# Patient Record
Sex: MALE | Race: WHITE | NOT HISPANIC OR LATINO | URBAN - METROPOLITAN AREA
[De-identification: names, ages, dates, MRNs, and addresses within clinical notes are randomized per-mention and may not be internally consistent; named-entity substitution may affect disease eponyms.]

---

## 2017-10-06 ENCOUNTER — EMERGENCY (EMERGENCY)
Facility: HOSPITAL | Age: 37
LOS: 1 days | Discharge: HOME | End: 2017-10-06
Attending: EMERGENCY MEDICINE | Admitting: EMERGENCY MEDICINE
Payer: SELF-PAY

## 2017-10-06 VITALS
TEMPERATURE: 98 F | SYSTOLIC BLOOD PRESSURE: 120 MMHG | RESPIRATION RATE: 20 BRPM | OXYGEN SATURATION: 97 % | HEART RATE: 101 BPM | DIASTOLIC BLOOD PRESSURE: 70 MMHG

## 2017-10-06 VITALS
TEMPERATURE: 98 F | SYSTOLIC BLOOD PRESSURE: 121 MMHG | RESPIRATION RATE: 20 BRPM | HEART RATE: 114 BPM | DIASTOLIC BLOOD PRESSURE: 76 MMHG | OXYGEN SATURATION: 96 %

## 2017-10-06 DIAGNOSIS — G40.909 EPILEPSY, UNSPECIFIED, NOT INTRACTABLE, WITHOUT STATUS EPILEPTICUS: ICD-10-CM

## 2017-10-06 LAB
ALBUMIN SERPL ELPH-MCNC: 4.2 G/DL — SIGNIFICANT CHANGE UP (ref 3.4–5)
ALP SERPL-CCNC: 84 U/L — SIGNIFICANT CHANGE UP (ref 40–120)
ALT FLD-CCNC: 53 U/L — HIGH (ref 12–42)
ANION GAP SERPL CALC-SCNC: 8 MMOL/L — LOW (ref 9–16)
APTT BLD: 27.9 SEC — SIGNIFICANT CHANGE UP (ref 27.5–36.5)
AST SERPL-CCNC: 37 U/L — SIGNIFICANT CHANGE UP (ref 15–37)
BASOPHILS NFR BLD AUTO: 0.3 % — SIGNIFICANT CHANGE UP (ref 0–2)
BILIRUB SERPL-MCNC: 0.3 MG/DL — SIGNIFICANT CHANGE UP (ref 0.2–1.2)
BUN SERPL-MCNC: 26 MG/DL — HIGH (ref 7–23)
CALCIUM SERPL-MCNC: 9 MG/DL — SIGNIFICANT CHANGE UP (ref 8.5–10.5)
CHLORIDE SERPL-SCNC: 102 MMOL/L — SIGNIFICANT CHANGE UP (ref 96–108)
CO2 SERPL-SCNC: 28 MMOL/L — SIGNIFICANT CHANGE UP (ref 22–31)
CREAT SERPL-MCNC: 1.31 MG/DL — HIGH (ref 0.5–1.3)
EOSINOPHIL NFR BLD AUTO: 0.3 % — SIGNIFICANT CHANGE UP (ref 0–6)
ETHANOL SERPL-MCNC: <3 MG/DL — SIGNIFICANT CHANGE UP
GLUCOSE SERPL-MCNC: 132 MG/DL — HIGH (ref 70–99)
HCT VFR BLD CALC: 42.3 % — SIGNIFICANT CHANGE UP (ref 39–50)
HGB BLD-MCNC: 15 G/DL — SIGNIFICANT CHANGE UP (ref 13–17)
IMM GRANULOCYTES NFR BLD AUTO: 0.9 % — SIGNIFICANT CHANGE UP (ref 0–1.5)
INR BLD: 1.09 — SIGNIFICANT CHANGE UP (ref 0.88–1.16)
LYMPHOCYTES # BLD AUTO: 12.4 % — LOW (ref 13–44)
MCHC RBC-ENTMCNC: 32.6 PG — SIGNIFICANT CHANGE UP (ref 27–34)
MCHC RBC-ENTMCNC: 35.5 G/DL — SIGNIFICANT CHANGE UP (ref 32–36)
MCV RBC AUTO: 92 FL — SIGNIFICANT CHANGE UP (ref 80–100)
MONOCYTES NFR BLD AUTO: 6.6 % — SIGNIFICANT CHANGE UP (ref 2–14)
NEUTROPHILS NFR BLD AUTO: 79.5 % — HIGH (ref 43–77)
PLATELET # BLD AUTO: 248 K/UL — SIGNIFICANT CHANGE UP (ref 150–400)
POTASSIUM SERPL-MCNC: 3.7 MMOL/L — SIGNIFICANT CHANGE UP (ref 3.5–5.3)
POTASSIUM SERPL-SCNC: 3.7 MMOL/L — SIGNIFICANT CHANGE UP (ref 3.5–5.3)
PROT SERPL-MCNC: 7.9 G/DL — SIGNIFICANT CHANGE UP (ref 6.4–8.2)
PROTHROM AB SERPL-ACNC: 12 SEC — SIGNIFICANT CHANGE UP (ref 9.8–12.7)
RBC # BLD: 4.6 M/UL — SIGNIFICANT CHANGE UP (ref 4.2–5.8)
RBC # FLD: 12.1 % — SIGNIFICANT CHANGE UP (ref 10.3–16.9)
SODIUM SERPL-SCNC: 138 MMOL/L — SIGNIFICANT CHANGE UP (ref 132–145)
WBC # BLD: 9.4 K/UL — SIGNIFICANT CHANGE UP (ref 3.8–10.5)
WBC # FLD AUTO: 9.4 K/UL — SIGNIFICANT CHANGE UP (ref 3.8–10.5)

## 2017-10-06 PROCEDURE — 99285 EMERGENCY DEPT VISIT HI MDM: CPT | Mod: 25

## 2017-10-06 PROCEDURE — 93010 ELECTROCARDIOGRAM REPORT: CPT

## 2017-10-06 RX ORDER — SODIUM CHLORIDE 9 MG/ML
1000 INJECTION INTRAMUSCULAR; INTRAVENOUS; SUBCUTANEOUS ONCE
Qty: 0 | Refills: 0 | Status: COMPLETED | OUTPATIENT
Start: 2017-10-06 | End: 2017-10-06

## 2017-10-06 RX ADMIN — SODIUM CHLORIDE 1000 MILLILITER(S): 9 INJECTION INTRAMUSCULAR; INTRAVENOUS; SUBCUTANEOUS at 21:00

## 2017-10-06 NOTE — ED PROVIDER NOTE - OBJECTIVE STATEMENT
Pt is a 38yo M with a h/o a szr d/o who is BIB EMS after having a szr this evening.  Witnessed GTC activity.  pt has no recollection of event.  No incontinence but does have alteral tongue lac.  Last szr was 2-3 months ago and admits he doesn't have a neurologist here in [facility] to follow-up with.  Pt takes Lamotrigine 200mg q hs (has not taken today's dose).  Has medication here and takes his own.  Denies any recent illness, trauma, or other complaints.

## 2017-10-06 NOTE — ED ADULT TRIAGE NOTE - CHIEF COMPLAINT QUOTE
BIBA for witnessed seizure while sitting on a couch at [facility] where he is staying. +tongue bite. Patient is known seizure patient, takes lamotrigine and is complete with medication. No incontinence during seizure. Patient is currently aaox3, 18G heplock left forearm. placed by medics in field. No distress. BIBA for witnessed seizure while sitting on a couch at [facility] where he is staying. +tongue bite. Patient is known seizure patient, takes lamotrigine and is compliant with medication. No incontinence during seizure. Patient is currently aaox3, 18G heplock left forearm. placed by medics in field. No distress.

## 2017-10-06 NOTE — ED ADULT NURSE NOTE - OBJECTIVE STATEMENT
here s/p seizure tonight in hotel; witnessed; hx sseizures; last one less than 3 months ago; on meds and compliant with them; no incontinence noted or obvious injury; alert and orientedx3; unsure of length of seizure

## 2017-10-06 NOTE — ED PROVIDER NOTE - MEDICAL DECISION MAKING DETAILS
Szr - will check labs and give IV hydration.  Pt is stable for d/c and gave referral to a new neurologist here in [facility].

## 2017-10-06 NOTE — ED ADULT NURSE NOTE - CHIEF COMPLAINT QUOTE
BIBA for witnessed seizure while sitting on a couch at [facility] where he is staying. +tongue bite. Patient is known seizure patient, takes lamotrigine and is complete with medication. No incontinence during seizure. Patient is currently aaox3, 18G heplock left forearm. placed by medics in field. No distress.

## 2017-10-06 NOTE — ED PROVIDER NOTE - PHYSICAL EXAMINATION
VITAL SIGNS: I have reviewed nursing notes and confirm.  CONSTITUTIONAL: Well-developed; well-nourished; in no acute distress.  SKIN: Skin is warm and dry, no acute rash.  HEAD: Normocephalic; atraumatic.  EYES: PERRL, EOM intact; conjunctiva and sclera clear.  ENT: No nasal discharge; airway clear.  NECK: Supple; non tender.  CARD: S1, S2 normal; no murmurs, gallops, or rubs. Regular rate and rhythm.  RESP: No wheezes, rales or rhonchi.  ABD: Normal bowel sounds; soft; non-distended; non-tender; no hepatosplenomegaly.  EXT: Normal ROM. No clubbing, cyanosis or edema.  NEURO: Alert, oriented. Grossly unremarkable, CN 2-12 intact, motor 5/5 throughout, SILT throughout, normal gait.   PSYCH: Cooperative, appropriate.

## 2018-08-05 VITALS
DIASTOLIC BLOOD PRESSURE: 77 MMHG | OXYGEN SATURATION: 97 % | HEART RATE: 108 BPM | SYSTOLIC BLOOD PRESSURE: 113 MMHG | RESPIRATION RATE: 20 BRPM | TEMPERATURE: 98 F

## 2018-08-05 LAB
ALBUMIN SERPL ELPH-MCNC: 4.4 G/DL — SIGNIFICANT CHANGE UP (ref 3.4–5)
ALP SERPL-CCNC: 81 U/L — SIGNIFICANT CHANGE UP (ref 40–120)
ALT FLD-CCNC: 52 U/L — HIGH (ref 12–42)
ANION GAP SERPL CALC-SCNC: 28 MMOL/L — HIGH (ref 9–16)
APAP SERPL-MCNC: <2 UG/ML — LOW (ref 10–30)
APTT BLD: 29.8 SEC — SIGNIFICANT CHANGE UP (ref 27.5–36.5)
AST SERPL-CCNC: 39 U/L — HIGH (ref 15–37)
BASOPHILS NFR BLD AUTO: 0.3 % — SIGNIFICANT CHANGE UP (ref 0–2)
BILIRUB SERPL-MCNC: 0.2 MG/DL — SIGNIFICANT CHANGE UP (ref 0.2–1.2)
BUN SERPL-MCNC: 21 MG/DL — SIGNIFICANT CHANGE UP (ref 7–23)
CALCIUM SERPL-MCNC: 10.6 MG/DL — HIGH (ref 8.5–10.5)
CHLORIDE SERPL-SCNC: 108 MMOL/L — SIGNIFICANT CHANGE UP (ref 96–108)
CK SERPL-CCNC: 360 U/L — HIGH (ref 39–308)
CO2 SERPL-SCNC: 16 MMOL/L — LOW (ref 22–31)
CREAT SERPL-MCNC: 1.33 MG/DL — HIGH (ref 0.5–1.3)
EOSINOPHIL NFR BLD AUTO: 1 % — SIGNIFICANT CHANGE UP (ref 0–6)
ETHANOL SERPL-MCNC: <3 MG/DL — SIGNIFICANT CHANGE UP
GLUCOSE SERPL-MCNC: 124 MG/DL — HIGH (ref 70–99)
HCT VFR BLD CALC: 47.9 % — SIGNIFICANT CHANGE UP (ref 39–50)
HGB BLD-MCNC: 15.7 G/DL — SIGNIFICANT CHANGE UP (ref 13–17)
IMM GRANULOCYTES NFR BLD AUTO: 0.7 % — SIGNIFICANT CHANGE UP (ref 0–1.5)
INR BLD: 0.99 — SIGNIFICANT CHANGE UP (ref 0.88–1.16)
LYMPHOCYTES # BLD AUTO: 37.8 % — SIGNIFICANT CHANGE UP (ref 13–44)
MAGNESIUM SERPL-MCNC: 2.4 MG/DL — SIGNIFICANT CHANGE UP (ref 1.6–2.6)
MCHC RBC-ENTMCNC: 32.7 PG — SIGNIFICANT CHANGE UP (ref 27–34)
MCHC RBC-ENTMCNC: 32.8 G/DL — SIGNIFICANT CHANGE UP (ref 32–36)
MCV RBC AUTO: 99.8 FL — SIGNIFICANT CHANGE UP (ref 80–100)
MONOCYTES NFR BLD AUTO: 8.1 % — SIGNIFICANT CHANGE UP (ref 2–14)
NEUTROPHILS NFR BLD AUTO: 52.1 % — SIGNIFICANT CHANGE UP (ref 43–77)
PCO2 BLDV: 48 MMHG — SIGNIFICANT CHANGE UP (ref 41–51)
PH BLDV: 7.18 — CRITICAL LOW (ref 7.32–7.43)
PHOSPHATE SERPL-MCNC: 3.9 MG/DL — SIGNIFICANT CHANGE UP (ref 2.5–4.5)
PLATELET # BLD AUTO: 298 K/UL — SIGNIFICANT CHANGE UP (ref 150–400)
PO2 BLDV: 79 MMHG — HIGH (ref 35–40)
POTASSIUM SERPL-MCNC: 3.7 MMOL/L — SIGNIFICANT CHANGE UP (ref 3.5–5.3)
POTASSIUM SERPL-SCNC: 3.7 MMOL/L — SIGNIFICANT CHANGE UP (ref 3.5–5.3)
PROT SERPL-MCNC: 8.6 G/DL — HIGH (ref 6.4–8.2)
PROTHROM AB SERPL-ACNC: 10.9 SEC — SIGNIFICANT CHANGE UP (ref 9.8–12.7)
RBC # BLD: 4.8 M/UL — SIGNIFICANT CHANGE UP (ref 4.2–5.8)
RBC # FLD: 12.1 % — SIGNIFICANT CHANGE UP (ref 10.3–16.9)
SALICYLATES SERPL-MCNC: 2.6 MG/DL — LOW (ref 2.8–20)
SAO2 % BLDV: 93 % — SIGNIFICANT CHANGE UP
SODIUM SERPL-SCNC: 152 MMOL/L — HIGH (ref 132–145)
WBC # BLD: 12 K/UL — HIGH (ref 3.8–10.5)
WBC # FLD AUTO: 12 K/UL — HIGH (ref 3.8–10.5)

## 2018-08-05 PROCEDURE — 99291 CRITICAL CARE FIRST HOUR: CPT

## 2018-08-05 PROCEDURE — 93010 ELECTROCARDIOGRAM REPORT: CPT

## 2018-08-05 RX ORDER — LEVETIRACETAM 250 MG/1
1000 TABLET, FILM COATED ORAL ONCE
Qty: 0 | Refills: 0 | Status: COMPLETED | OUTPATIENT
Start: 2018-08-05 | End: 2018-08-05

## 2018-08-05 RX ORDER — SODIUM CHLORIDE 9 MG/ML
2000 INJECTION INTRAMUSCULAR; INTRAVENOUS; SUBCUTANEOUS ONCE
Qty: 0 | Refills: 0 | Status: COMPLETED | OUTPATIENT
Start: 2018-08-05 | End: 2018-08-05

## 2018-08-05 RX ADMIN — SODIUM CHLORIDE 2000 MILLILITER(S): 9 INJECTION INTRAMUSCULAR; INTRAVENOUS; SUBCUTANEOUS at 23:07

## 2018-08-05 RX ADMIN — Medication 2 MILLIGRAM(S): at 23:00

## 2018-08-05 RX ADMIN — LEVETIRACETAM 440 MILLIGRAM(S): 250 TABLET, FILM COATED ORAL at 23:06

## 2018-08-05 NOTE — ED PROVIDER NOTE - PROGRESS NOTE DETAILS
sister [phone]  hx of sz, noncompliance, EtOH use. postictal, 4mg of ativan, will need stepdown/ICU bed, called transfer center, admitted under neuro to [facility] sister [phone], listed as emergency contact, called to obtain collateral information as pt unable to give history.  also discussed most likely will need to transfer to [facility] for further evaluation.   sister call his parents.    hx of sz, noncompliance, EtOH use.

## 2018-08-05 NOTE — ED PROVIDER NOTE - OBJECTIVE STATEMENT
38 yom bibems for sz at home, accompanied by friend, who did not witness the entire episode but was awaken by the noise.  pt w/ hx of seizure, on lamictal, reported noncompliance per EMS.  pt had another episode of sz in ED, w/ GTC, rec'd ativan, postictal.  called family, hx of noncompliance, w/ hx of EtOH use, unclear whether about hx of withdrawal.  friend states did not use any substance in the last 24 hours but unsure prior.

## 2018-08-05 NOTE — ED PROVIDER NOTE - CRITICAL CARE PROVIDED
telephone consultation with the patient's family/additional history taking/consult w/ pt's family directly relating to pts condition/direct patient care (not related to procedure)

## 2018-08-05 NOTE — ED ADULT NURSE NOTE - NSIMPLEMENTINTERV_GEN_ALL_ED
Implemented All Fall Risk Interventions:  [place] to call system. Call bell, personal items and telephone within reach. Instruct patient to call for assistance. Room bathroom lighting operational. Non-slip footwear when patient is off stretcher. Physically safe environment: no spills, clutter or unnecessary equipment. Stretcher in lowest position, wheels locked, appropriate side rails in place. Provide visual cue, wrist band, yellow gown, etc. Monitor gait and stability. Monitor for mental status changes and reorient to person, place, and time. Review medications for side effects contributing to fall risk. Reinforce activity limits and safety measures with patient and family.

## 2018-08-05 NOTE — ED ADULT NURSE NOTE - OBJECTIVE STATEMENT
pt is a 38 year old male who comes into the ed c/o seizure. pt accompanied by friend who reports she woke up to patient seizing in bed with no injury or falls. pt was awake in ambulance and reported hx of seizures, pt had a witnessed seizure upon arrival to ED w/ tongue biting. pt postictal in bed. suction setup at bedside, seizure precautions maintained, pt placed on cardiac monitor and IV placed.

## 2018-08-05 NOTE — ED ADULT TRIAGE NOTE - CHIEF COMPLAINT QUOTE
Patient arrived via EMS complaining of seizures with history of seizure; patient has been noncompliant with his lamictal; had 1 seizure prior to arrival in a bed with no injury or trauma; patient then seized upon arrival to ED with tongue bitting, tonic clonic movements

## 2018-08-05 NOTE — ED PROVIDER NOTE - PHYSICAL EXAMINATION
CON: actively sz, now postictal after ativan, HENMT: soft neck, HEAD: atraumatic, CV: tachycardic, RESP: cta b/l, GI: +BS, soft, nontender, no rebound, no guarding, SKIN: no rash, MSK: no deformities, NEURO: noted GTC, now somnolent after ativan,

## 2018-08-05 NOTE — ED PROVIDER NOTE - MEDICAL DECISION MAKING DETAILS
seizure, rec'd ativan, protecting airway, eval for metabolic derangement, ?noncompliance, ?withdrawal sz, ?substance, CT head    noted hyperglycemia, AGAP, blood gas added, likely lactic acidosis given recent sz, free water deficit ~4L, will continue to hydrate, ICU consultation

## 2018-08-06 ENCOUNTER — INPATIENT (INPATIENT)
Facility: HOSPITAL | Age: 38
LOS: 0 days | Discharge: HOME | DRG: 101 | End: 2018-08-06
Attending: PSYCHIATRY & NEUROLOGY | Admitting: PSYCHIATRY & NEUROLOGY
Payer: MEDICAID

## 2018-08-06 ENCOUNTER — TRANSCRIPTION ENCOUNTER (OUTPATIENT)
Age: 38
End: 2018-08-06

## 2018-08-06 DIAGNOSIS — D72.829 ELEVATED WHITE BLOOD CELL COUNT, UNSPECIFIED: ICD-10-CM

## 2018-08-06 DIAGNOSIS — E87.0 HYPEROSMOLALITY AND HYPERNATREMIA: ICD-10-CM

## 2018-08-06 DIAGNOSIS — F14.10 COCAINE ABUSE, UNCOMPLICATED: ICD-10-CM

## 2018-08-06 DIAGNOSIS — Z91.89 OTHER SPECIFIED PERSONAL RISK FACTORS, NOT ELSEWHERE CLASSIFIED: ICD-10-CM

## 2018-08-06 DIAGNOSIS — E87.2 ACIDOSIS: ICD-10-CM

## 2018-08-06 DIAGNOSIS — G40.901 EPILEPSY, UNSPECIFIED, NOT INTRACTABLE, WITH STATUS EPILEPTICUS: ICD-10-CM

## 2018-08-06 DIAGNOSIS — N17.9 ACUTE KIDNEY FAILURE, UNSPECIFIED: ICD-10-CM

## 2018-08-06 DIAGNOSIS — Z29.9 ENCOUNTER FOR PROPHYLACTIC MEASURES, UNSPECIFIED: ICD-10-CM

## 2018-08-06 DIAGNOSIS — R63.8 OTHER SYMPTOMS AND SIGNS CONCERNING FOOD AND FLUID INTAKE: ICD-10-CM

## 2018-08-06 PROBLEM — R56.9 UNSPECIFIED CONVULSIONS: Chronic | Status: ACTIVE | Noted: 2017-10-06

## 2018-08-06 LAB
ALBUMIN SERPL ELPH-MCNC: 3.7 G/DL — SIGNIFICANT CHANGE UP (ref 3.3–5)
ALBUMIN SERPL ELPH-MCNC: 4.2 G/DL — SIGNIFICANT CHANGE UP (ref 3.3–5)
ALP SERPL-CCNC: 55 U/L — SIGNIFICANT CHANGE UP (ref 40–120)
ALP SERPL-CCNC: 59 U/L — SIGNIFICANT CHANGE UP (ref 40–120)
ALT FLD-CCNC: 31 U/L — SIGNIFICANT CHANGE UP (ref 10–45)
ALT FLD-CCNC: 35 U/L — SIGNIFICANT CHANGE UP (ref 10–45)
ANION GAP SERPL CALC-SCNC: 11 MMOL/L — SIGNIFICANT CHANGE UP (ref 5–17)
ANION GAP SERPL CALC-SCNC: 11 MMOL/L — SIGNIFICANT CHANGE UP (ref 5–17)
AST SERPL-CCNC: 27 U/L — SIGNIFICANT CHANGE UP (ref 10–40)
AST SERPL-CCNC: 34 U/L — SIGNIFICANT CHANGE UP (ref 10–40)
BILIRUB SERPL-MCNC: 0.2 MG/DL — SIGNIFICANT CHANGE UP (ref 0.2–1.2)
BILIRUB SERPL-MCNC: 0.2 MG/DL — SIGNIFICANT CHANGE UP (ref 0.2–1.2)
BUN SERPL-MCNC: 15 MG/DL — SIGNIFICANT CHANGE UP (ref 7–23)
BUN SERPL-MCNC: 18 MG/DL — SIGNIFICANT CHANGE UP (ref 7–23)
CALCIUM SERPL-MCNC: 8.4 MG/DL — SIGNIFICANT CHANGE UP (ref 8.4–10.5)
CALCIUM SERPL-MCNC: 8.6 MG/DL — SIGNIFICANT CHANGE UP (ref 8.4–10.5)
CHLORIDE SERPL-SCNC: 107 MMOL/L — SIGNIFICANT CHANGE UP (ref 96–108)
CHLORIDE SERPL-SCNC: 107 MMOL/L — SIGNIFICANT CHANGE UP (ref 96–108)
CK SERPL-CCNC: 348 U/L — HIGH (ref 30–200)
CK SERPL-CCNC: 357 U/L — HIGH (ref 30–200)
CO2 SERPL-SCNC: 25 MMOL/L — SIGNIFICANT CHANGE UP (ref 22–31)
CO2 SERPL-SCNC: 26 MMOL/L — SIGNIFICANT CHANGE UP (ref 22–31)
CREAT ?TM UR-MCNC: 44 MG/DL — SIGNIFICANT CHANGE UP
CREAT SERPL-MCNC: 1.04 MG/DL — SIGNIFICANT CHANGE UP (ref 0.5–1.3)
CREAT SERPL-MCNC: 1.11 MG/DL — SIGNIFICANT CHANGE UP (ref 0.5–1.3)
GAS PNL BLDV: SIGNIFICANT CHANGE UP
GLUCOSE SERPL-MCNC: 101 MG/DL — HIGH (ref 70–99)
GLUCOSE SERPL-MCNC: 124 MG/DL — HIGH (ref 70–99)
HCT VFR BLD CALC: 38.1 % — LOW (ref 39–50)
HCT VFR BLD CALC: 38.9 % — LOW (ref 39–50)
HGB BLD-MCNC: 12.8 G/DL — LOW (ref 13–17)
HGB BLD-MCNC: 13 G/DL — SIGNIFICANT CHANGE UP (ref 13–17)
LACTATE SERPL-SCNC: 1.6 MMOL/L — SIGNIFICANT CHANGE UP (ref 0.5–2)
LACTATE SERPL-SCNC: 2.4 MMOL/L — HIGH (ref 0.5–2)
MAGNESIUM SERPL-MCNC: 2.1 MG/DL — SIGNIFICANT CHANGE UP (ref 1.6–2.6)
MAGNESIUM SERPL-MCNC: 2.4 MG/DL — SIGNIFICANT CHANGE UP (ref 1.6–2.6)
MCHC RBC-ENTMCNC: 31.4 PG — SIGNIFICANT CHANGE UP (ref 27–34)
MCHC RBC-ENTMCNC: 32.3 PG — SIGNIFICANT CHANGE UP (ref 27–34)
MCHC RBC-ENTMCNC: 32.9 G/DL — SIGNIFICANT CHANGE UP (ref 32–36)
MCHC RBC-ENTMCNC: 34.1 G/DL — SIGNIFICANT CHANGE UP (ref 32–36)
MCV RBC AUTO: 94.5 FL — SIGNIFICANT CHANGE UP (ref 80–100)
MCV RBC AUTO: 95.6 FL — SIGNIFICANT CHANGE UP (ref 80–100)
OSMOLALITY SERPL: 334 MOS/KG — HIGH (ref 275–300)
OSMOLALITY UR: 364 MOSMOL/KG — SIGNIFICANT CHANGE UP (ref 100–650)
PCP SPEC-MCNC: SIGNIFICANT CHANGE UP
PHOSPHATE SERPL-MCNC: 2.8 MG/DL — SIGNIFICANT CHANGE UP (ref 2.5–4.5)
PLATELET # BLD AUTO: 216 K/UL — SIGNIFICANT CHANGE UP (ref 150–400)
PLATELET # BLD AUTO: 231 K/UL — SIGNIFICANT CHANGE UP (ref 150–400)
POTASSIUM SERPL-MCNC: 3.8 MMOL/L — SIGNIFICANT CHANGE UP (ref 3.5–5.3)
POTASSIUM SERPL-MCNC: 3.8 MMOL/L — SIGNIFICANT CHANGE UP (ref 3.5–5.3)
POTASSIUM SERPL-SCNC: 3.8 MMOL/L — SIGNIFICANT CHANGE UP (ref 3.5–5.3)
POTASSIUM SERPL-SCNC: 3.8 MMOL/L — SIGNIFICANT CHANGE UP (ref 3.5–5.3)
PROT SERPL-MCNC: 6.1 G/DL — SIGNIFICANT CHANGE UP (ref 6–8.3)
PROT SERPL-MCNC: 6.4 G/DL — SIGNIFICANT CHANGE UP (ref 6–8.3)
RBC # BLD: 4.03 M/UL — LOW (ref 4.2–5.8)
RBC # BLD: 4.07 M/UL — LOW (ref 4.2–5.8)
RBC # FLD: 12.5 % — SIGNIFICANT CHANGE UP (ref 10.3–16.9)
RBC # FLD: 12.7 % — SIGNIFICANT CHANGE UP (ref 10.3–16.9)
SODIUM SERPL-SCNC: 143 MMOL/L — SIGNIFICANT CHANGE UP (ref 135–145)
SODIUM SERPL-SCNC: 144 MMOL/L — SIGNIFICANT CHANGE UP (ref 135–145)
SODIUM UR-SCNC: 182 MMOL/L — SIGNIFICANT CHANGE UP
SODIUM UR-SCNC: 99 MMOL/L — SIGNIFICANT CHANGE UP
WBC # BLD: 12.2 K/UL — HIGH (ref 3.8–10.5)
WBC # BLD: 8.2 K/UL — SIGNIFICANT CHANGE UP (ref 3.8–10.5)
WBC # FLD AUTO: 12.2 K/UL — HIGH (ref 3.8–10.5)
WBC # FLD AUTO: 8.2 K/UL — SIGNIFICANT CHANGE UP (ref 3.8–10.5)

## 2018-08-06 PROCEDURE — 71045 X-RAY EXAM CHEST 1 VIEW: CPT | Mod: 26

## 2018-08-06 PROCEDURE — 70450 CT HEAD/BRAIN W/O DYE: CPT | Mod: 26

## 2018-08-06 RX ORDER — LEVETIRACETAM 250 MG/1
1 TABLET, FILM COATED ORAL
Qty: 60 | Refills: 1 | OUTPATIENT
Start: 2018-08-06 | End: 2018-10-04

## 2018-08-06 RX ORDER — LAMOTRIGINE 25 MG/1
200 TABLET, ORALLY DISINTEGRATING ORAL DAILY
Qty: 0 | Refills: 0 | Status: DISCONTINUED | OUTPATIENT
Start: 2018-08-06 | End: 2018-08-06

## 2018-08-06 RX ORDER — LAMOTRIGINE 25 MG/1
200 TABLET, ORALLY DISINTEGRATING ORAL ONCE
Qty: 0 | Refills: 0 | Status: DISCONTINUED | OUTPATIENT
Start: 2018-08-06 | End: 2018-08-06

## 2018-08-06 RX ORDER — SODIUM CHLORIDE 9 MG/ML
1000 INJECTION, SOLUTION INTRAVENOUS
Qty: 0 | Refills: 0 | Status: DISCONTINUED | OUTPATIENT
Start: 2018-08-06 | End: 2018-08-06

## 2018-08-06 RX ORDER — SODIUM CHLORIDE 9 MG/ML
1000 INJECTION INTRAMUSCULAR; INTRAVENOUS; SUBCUTANEOUS
Qty: 0 | Refills: 0 | Status: DISCONTINUED | OUTPATIENT
Start: 2018-08-06 | End: 2018-08-06

## 2018-08-06 RX ORDER — LAMOTRIGINE 25 MG/1
1 TABLET, ORALLY DISINTEGRATING ORAL
Qty: 0 | Refills: 0 | COMMUNITY

## 2018-08-06 RX ADMIN — SODIUM CHLORIDE 130 MILLILITER(S): 9 INJECTION INTRAMUSCULAR; INTRAVENOUS; SUBCUTANEOUS at 05:12

## 2018-08-06 RX ADMIN — SODIUM CHLORIDE 200 MILLILITER(S): 9 INJECTION, SOLUTION INTRAVENOUS at 03:21

## 2018-08-06 NOTE — H&P ADULT - PROBLEM SELECTOR PLAN 6
F:  - s/p 2L NS in ED  - start 200cc/hr D5 1/2NS  E: Replete K<4, Mg<2 PRN  N: Regular diet Patient UTox positive for cocaine, denies use.  - Will  regarding cocaine abuse cessation

## 2018-08-06 NOTE — H&P ADULT - PROBLEM SELECTOR PLAN 7
DVT PPx: IMPROVE Score 0, no pharmacoprophylaxis. SCDs.  GI PPx: Not indicated    Code: Full  Dispo: 7L F:  - s/p 2L NS in ED  - start 200cc/hr D5 1/2NS  E: Replete K<4, Mg<2 PRN  N: Regular diet F:  - s/p 2L NS in ED  - start 200cc/hr D5 1/2NS  E: Replete K<4, Mg<2 PRN  N: NPO until dysphagia screen passed, then regular diet

## 2018-08-06 NOTE — H&P ADULT - ASSESSMENT
Patient is a 38M w PMH of seizures, on lamotrigine with poor compliance as per family, who presented to [facility] via EMS after experiencing a seizure at home and admitted for management of status epilepticus.

## 2018-08-06 NOTE — PROGRESS NOTE ADULT - ASSESSMENT
38M w PMH of seizures, on lamotrigine with poor compliance as per family, who presented to [facility] via EMS after experiencing a seizure at home and admitted for management of status epilepticus. No seizure activity overnight, stable for discharge.

## 2018-08-06 NOTE — DISCHARGE NOTE ADULT - MEDICATION SUMMARY - MEDICATIONS TO TAKE
I will START or STAY ON the medications listed below when I get home from the hospital:    lamoTRIgine 200 mg oral tablet, extended release  -- 1 tab(s) by mouth once a day  -- Indication: For Epilepsy    Keppra 500 mg oral tablet  -- 1 tab(s) by mouth 2 times a day   -- Check with your doctor before becoming pregnant.  It is very important that you take or use this exactly as directed.  Do not skip doses or discontinue unless directed by your doctor.  May cause drowsiness or dizziness.  Obtain medical advice before taking any non-prescription drugs as some may affect the action of this medication.  Swallow whole.  Do not crush.  This drug may impair the ability to drive or operate machinery.  Use care until you become familiar with its effects.    -- Indication: For Epilepsy

## 2018-08-06 NOTE — H&P ADULT - PROBLEM SELECTOR PLAN 4
Patient acidotic to 7.11 on presentation, HCO3 decreased to 16.   - Likely 2/2 to lactic acidosis from seizure  - lactate  - VBG  - trend BMP

## 2018-08-06 NOTE — DISCHARGE NOTE ADULT - PATIENT PORTAL LINK FT
You can access the [facility] Patient Portal, offered by [facility], by registering with the following website: [email or web address]

## 2018-08-06 NOTE — PROGRESS NOTE ADULT - PROBLEM SELECTOR PLAN 8
DVT PPx: IMPROVE Score 0, no pharmacoprophylaxis. SCDs.  GI PPx: Not indicated    Code: Full  Dispo: 7L

## 2018-08-06 NOTE — DISCHARGE NOTE ADULT - CARE PROVIDER_API CALL
Selina Marshall), Neurology  [address]  [address] [facility] [place], [place] [facility]  Phone: [phone]  Fax: [phone]

## 2018-08-06 NOTE — H&P ADULT - PROBLEM SELECTOR PLAN 9
Patient's PCP affliated with [facility]  Patient's neurologist in [place], name unknown.

## 2018-08-06 NOTE — H&P ADULT - HISTORY OF PRESENT ILLNESS
Patient is a 38M w PMH of seizures, on lamictal with poor compliance as per family, who presented to [facility] via EMS after experiencing a seizure at home. Patient is a 38M w PMH of seizures, on lamotrigine with poor compliance as per family, who presented to [facility] via EMS after experiencing a seizure at home. The patient's friend was awoken last PM when she heard noises from the patient and found him experiencing a GTC seizure, bleeding from his tongue, eyes back, and foaming at the mouth. She estimates the seizure lasting at least one minute. The patient remembers experiencing word-finding difficulties before the seizure, but not remembering the events thereafter. He was then brought into the ED. He denies any flashing lights, chest pain, shortness of breath, nausea, vomiting, numbness, weakness, tingling, or loss of bladder/bowel control. He states that he experiences less than 1 seizure per year and his most recent seizure was months ago. He follows with a neurologist in [place] who he has not seen for a few years. He reports daily compliance with his lamotrigine. He states that alcohol can be a trigger for seizure for him, but denies heavy alcohol use.     In the ED, he had not returned to baseline before he had a second GTC seizure for which he was given 4mg of ativan. He remained confused in a post-ictal state for the duration of his time in the ED. Vitals on presentation were T 97.9, BP 11/77, , RR 20, 97% on RA. CT imaging was negative for acute infarct or bleed. His labs were notable for hypernatremia to 152, Cr of 1.3, Gap of 28, CK to 360, Ph of 7.18, and UTox positive for cocaine. He was keppra loaded with 1g and bolused with 2L of NS. He was admitted to  for further management of status epilepticus.

## 2018-08-06 NOTE — DISCHARGE NOTE ADULT - NS AS ACTIVITY OBS
Sex allowed/Do not drive or operate machinery/Walking-Outdoors allowed/Stairs allowed/Return to Work/School allowed/Bathing allowed/Showering allowed

## 2018-08-06 NOTE — H&P ADULT - PROBLEM SELECTOR PLAN 5
Patient UTox positive for cocaine, denies use.  - Will  regarding cocaine abuse cessation WBC to 12, most likely reactive from seizure  - trend CBC

## 2018-08-06 NOTE — H&P ADULT - PROBLEM SELECTOR PLAN 8
Patient's PCP affliated with [facility]  Patient's neurologist in [place], name unknown. DVT PPx: IMPROVE Score 0, no pharmacoprophylaxis. SCDs.  GI PPx: Not indicated    Code: Full  Dispo: 7L

## 2018-08-06 NOTE — H&P ADULT - NSHPPHYSICALEXAM_GEN_ALL_CORE
.  VITAL SIGNS:  T(C): 36.6 (08-05-18 @ 22:57), Max: 36.6 (08-05-18 @ 22:57)  T(F): 97.9 (08-05-18 @ 22:57), Max: 97.9 (08-05-18 @ 22:57)  HR: 100 (08-06-18 @ 02:23) (72 - 112)  BP: 144/75 (08-06-18 @ 02:23) (113/77 - 144/75)  BP(mean): 103 (08-06-18 @ 02:23) (103 - 103)  RR: 16 (08-06-18 @ 02:23) (16 - 20)  SpO2: 97% (08-06-18 @ 02:23) (96% - 99%)  Wt(kg): --    PHYSICAL EXAM:    Constitutional: WDWN resting comfortably in bed; NAD  Head: NC/AT  Eyes: PERRL, EOMI, anicteric sclera  ENT: no nasal discharge; uvula midline, bite marks with evidence of bleeding on tongue and lips. MMM  Neck: supple; no JVD or thyromegaly  Respiratory: CTA B/L; no W/R/R, no retractions  Cardiac: +S1/S2; RRR; no M/R/G; PMI non-displaced  Gastrointestinal: abdomen soft, NT/ND; no rebound or guarding; +BSx4  Extremities: WWP, no clubbing or cyanosis; no peripheral edema  Vascular: 2+ radial, femoral, DP/PT pulses B/L  Neurologic: AAOx3; CNII-XII grossly intact; bilateral dysmetria on finger-nose. Exam limited by patient attention and participation.  Psychiatric: affect and characteristics of appearance, verbalizations, behaviors are appropriate

## 2018-08-06 NOTE — H&P ADULT - PROBLEM SELECTOR PLAN 1
Patient experienced 2 GTC seizures without intervening return to baseline. Takes lamotrigine ER 200mg at home, but compliance is poor as per family. Has not followed up with neurologist in several years.  - Keppra loaded in ED  - c/w lamotrigine ER 200mg qD  - s/p 4mg ativan in ED, will give PRN seizure activity   - vEEG in AM

## 2018-08-06 NOTE — PROGRESS NOTE ADULT - PROBLEM SELECTOR PLAN 7
F:  - s/p 2L NS in ED  - start 200cc/hr D5 1/2NS  E: Replete K<4, Mg<2 PRN  N: NPO until dysphagia screen passed, then regular diet

## 2018-08-06 NOTE — PROGRESS NOTE ADULT - SUBJECTIVE AND OBJECTIVE BOX
INTERVAL HPI/OVERNIGHT EVENTS:    SUBJECTIVE: Patient seen and examined at bedside.    OBJECTIVE:    VITAL SIGNS:  ICU Vital Signs Last 24 Hrs  T(C): 36.6 (06 Aug 2018 09:33), Max: 37.1 (06 Aug 2018 02:23)  T(F): 97.9 (06 Aug 2018 09:33), Max: 98.8 (06 Aug 2018 02:23)  HR: 64 (06 Aug 2018 08:34) (64 - 112)  BP: 109/71 (06 Aug 2018 08:34) (109/71 - 144/75)  BP(mean): 84 (06 Aug 2018 08:34) (83 - 103)  ABP: --  ABP(mean): --  RR: 17 (06 Aug 2018 08:34) (16 - 20)  SpO2: 96% (06 Aug 2018 08:34) (96% - 99%)        08-05 @ 07:01  -  08-06 @ 07:00  --------------------------------------------------------  IN: 200 mL / OUT: 1325 mL / NET: -1125 mL      CAPILLARY BLOOD GLUCOSE          PHYSICAL EXAM:    General: NAD  HEENT: NC/AT; PERRL, clear conjunctiva  Neck: supple  Respiratory: CTA b/l  Cardiovascular: +S1/S2; RRR  Abdomen: soft, NT/ND; +BS x4  Extremities: WWP, 2+ peripheral pulses b/l; no LE edema  Skin: normal color and turgor; no rash  Neurological:     MEDICATIONS:  MEDICATIONS  (STANDING):  lamoTRIgine 200 milliGRAM(s) Oral daily  sodium chloride 0.9%. 1000 milliLiter(s) (130 mL/Hr) IV Continuous <Continuous>    MEDICATIONS  (PRN):      ALLERGIES:  Allergies    No Known Allergies    Intolerances        LABS:                        13.0   12.2  )-----------( 231      ( 06 Aug 2018 03:48 )             38.1     08-06    143  |  107  |  18  ----------------------------<  124<H>  3.8   |  25  |  1.11    Ca    8.6      06 Aug 2018 03:48  Phos  3.9     08-05  Mg     2.4     08-06    TPro  6.1  /  Alb  4.2  /  TBili  0.2  /  DBili  x   /  AST  34  /  ALT  35  /  AlkPhos  55  08-06    PT/INR - ( 05 Aug 2018 22:58 )   PT: 10.9 sec;   INR: 0.99          PTT - ( 05 Aug 2018 22:58 )  PTT:29.8 sec      RADIOLOGY & ADDITIONAL TESTS: Reviewed.    ASSESSMENT:     PLAN:    FEN - no IVF; replete lytes prn; NPO    PPx - HSQ    CODE - FULL.    DISPO - continue telemetry monitoring in ICU-step down level of care on 7lachman. INTERVAL HPI/OVERNIGHT EVENTS: ABRIL.     SUBJECTIVE: Patient seen and examined at bedside.    OBJECTIVE:    VITAL SIGNS:  ICU Vital Signs Last 24 Hrs  T(C): 36.6 (06 Aug 2018 09:33), Max: 37.1 (06 Aug 2018 02:23)  T(F): 97.9 (06 Aug 2018 09:33), Max: 98.8 (06 Aug 2018 02:23)  HR: 64 (06 Aug 2018 08:34) (64 - 112)  BP: 109/71 (06 Aug 2018 08:34) (109/71 - 144/75)  BP(mean): 84 (06 Aug 2018 08:34) (83 - 103)  RR: 17 (06 Aug 2018 08:34) (16 - 20)  SpO2: 96% (06 Aug 2018 08:34) (96% - 99%)      08-05 @ 07:01  -  08-06 @ 07:00  --------------------------------------------------------  IN: 200 mL / OUT: 1325 mL / NET: -1125 mL      PHYSICAL EXAM:  Constitutional: WDWN resting comfortably in bed; NAD  Head: NC/AT  Eyes: PERRL, EOMI, anicteric sclera  ENT: no nasal discharge; uvula midline, bite marks with evidence of bleeding on tongue and lips. MMM  Neck: supple; no JVD or thyromegaly  Respiratory: CTA B/L; no W/R/R, no retractions  Cardiac: +S1/S2; RRR; no M/R/G; PMI non-displaced  Gastrointestinal: abdomen soft, NT/ND; no rebound or guarding; +BSx4  Extremities: WWP, no clubbing or cyanosis; no peripheral edema  Vascular: 2+ radial, femoral, DP/PT pulses B/L  Neurologic: AAOx3; CNII-XII grossly intact; bilateral dysmetria on finger-nose. Exam limited by patient attention and participation.  Psychiatric: affect and characteristics of appearance, verbalizatio    MEDICATIONS:  MEDICATIONS  (STANDING):  lamoTRIgine 200 milliGRAM(s) Oral daily  sodium chloride 0.9%. 1000 milliLiter(s) (130 mL/Hr) IV Continuous <Continuous>    MEDICATIONS  (PRN):      ALLERGIES    No Known Allergies    Intolerances        LABS:                        13.0   12.2  )-----------( 231      ( 06 Aug 2018 03:48 )             38.1     08-06    143  |  107  |  18  ----------------------------<  124<H>  3.8   |  25  |  1.11    Ca    8.6      06 Aug 2018 03:48  Phos  3.9     08-05  Mg     2.4     08-06    TPro  6.1  /  Alb  4.2  /  TBili  0.2  /  DBili  x   /  AST  34  /  ALT  35  /  AlkPhos  55  08-06    PT/INR - ( 05 Aug 2018 22:58 )   PT: 10.9 sec;   INR: 0.99          PTT - ( 05 Aug 2018 22:58 )  PTT:29.8 sec      RADIOLOGY & ADDITIONAL TESTS: Reviewed.

## 2018-08-06 NOTE — PROGRESS NOTE ADULT - PROBLEM SELECTOR PLAN 4
Patient acidotic to 7.11 on presentation, HCO3 decreased to 16.   - Likely 2/2 to lactic acidosis from seizure  - lactate down trending  - VBG  - trend BMP

## 2018-08-06 NOTE — H&P ADULT - PROBLEM SELECTOR PLAN 2
Na 152 on admission. Could be trigger for seizure activity. Patient states he has good PO intake.   - f/u urine lytes, urine osm  - s/p 2L NS in ED  - start 200cc/hr D5 1/2NS  - trend BMP

## 2018-08-06 NOTE — DISCHARGE NOTE ADULT - PLAN OF CARE
Please take medication as prescribed You came to the hospital after you had a witnessed seizure. You had another seizure whilst you were in the Emergency department and we gave you some medication afterwards to prevent further seizures from happening. We admitted you for closer monitoring and placed you on video EEG to monitor for any more seizures. You were stable overnight without any repeat episodes. We added and extra medication to your anti-epileptic medication to protect you from having another seizure. Please remember that you will not be able to drive until 8/6/2019 (1 year from the date of your last seizure) Please try and abstain from illicit drug use as this may increase your risk of having another seizure. We did a Urine toxicology and found that you had cocaine in your urine. Cocaine can cause seizure, and we encourage you to try and abstain from using it. In any case please take your anti-epiliptic medications as prescribed to decrease your chances of seizure Please follow up with the Neurologist and your primary care doctor within 2 weeks. I attached the details of the Neurologist that saw you in the  hospital. Please schedule an appointment to see her in the office at your earliest convenience.

## 2018-08-06 NOTE — H&P ADULT - PROBLEM SELECTOR PLAN 3
Patient Cr is 1.3 from unknown baseline. No PMH of renal disease. May be due to volume depletion as pt is hypernatremic.  - Trend Cr

## 2018-08-06 NOTE — H&P ADULT - NSHPLABSRESULTS_GEN_ALL_CORE
.  LABS:                         15.7   12.0  )-----------( 298      ( 05 Aug 2018 22:58 )             47.9     08-05    152<H>  |  108  |  21  ----------------------------<  124<H>  3.7   |  16<L>  |  1.33<H>    Ca    10.6<H>      05 Aug 2018 22:58  Phos  3.9     08-05  Mg     2.4     08-05    TPro  8.6<H>  /  Alb  4.4  /  TBili  0.2  /  DBili  x   /  AST  39<H>  /  ALT  52<H>  /  AlkPhos  81  08-05    PT/INR - ( 05 Aug 2018 22:58 )   PT: 10.9 sec;   INR: 0.99          PTT - ( 05 Aug 2018 22:58 )  PTT:29.8 sec    CARDIAC MARKERS ( 05 Aug 2018 22:58 )  x     / x     / 360 U/L / x     / x                RADIOLOGY, EKG & ADDITIONAL TESTS: Reviewed.

## 2018-08-06 NOTE — DISCHARGE NOTE ADULT - HOSPITAL COURSE
38M w PMH of seizures, on lamotrigine with poor compliance as per family, who presented to [facility] via EMS after experiencing a seizure at home. The patient's friend was awoken last PM when she heard noises from the patient and found him experiencing a GTC seizure, bleeding from his tongue, eyes back, and foaming at the mouth. She estimates the seizure lasting at least one minute. The patient remembers experiencing word-finding difficulties before the seizure, but not remembering the events thereafter. He was then brought into the ED. He denies any flashing lights, chest pain, shortness of breath, nausea, vomiting, numbness, weakness, tingling, or loss of bladder/bowel control. He states that he experiences less than 1 seizure per year and his most recent seizure was months ago. He follows with a neurologist in [place] who he has not seen for a few years. He reports daily compliance with his lamotrigine. He states that alcohol can be a trigger for seizure for him, but denies heavy alcohol use.   In the ED, he had not returned to baseline before he had a second GTC seizure for which he was given 4mg of ativan. He remained confused in a post-ictal state for the duration of his time in the ED. Vitals on presentation were T 97.9, BP 11/77, , RR 20, 97% on RA. CT imaging was negative for acute infarct or bleed. His labs were notable for hypernatremia to 152, Cr of 1.3, Gap of 28, CK to 360, Ph of 7.18, and UTox positive for cocaine. He was keppra loaded with 1g and bolused with 2L of NS. He was admitted to  for further management of status epilepticus. Patient comfortable overnight, no seizure activity on EEG, ready for discharge home with addition of Keppra 300 mg BID to Epileptic medication 38M w PMH of seizures, on lamotrigine with poor compliance as per family, who presented to [facility] via EMS after experiencing a seizure at home. The patient's friend was awoken last PM when she heard noises from the patient and found him experiencing a GTC seizure, bleeding from his tongue, eyes back, and foaming at the mouth. She estimates the seizure lasting at least one minute. The patient remembers experiencing word-finding difficulties before the seizure, but not remembering the events thereafter. He was then brought into the ED. He denies any flashing lights, chest pain, shortness of breath, nausea, vomiting, numbness, weakness, tingling, or loss of bladder/bowel control. He states that he experiences less than 1 seizure per year and his most recent seizure was months ago. He follows with a neurologist in [place] who he has not seen for a few years. He reports daily compliance with his lamotrigine. He states that alcohol can be a trigger for seizure for him, but denies heavy alcohol use.   In the ED, he had not returned to baseline before he had a second GTC seizure for which he was given 4mg of ativan. He remained confused in a post-ictal state for the duration of his time in the ED. Vitals on presentation were T 97.9, BP 11/77, , RR 20, 97% on RA. CT imaging was negative for acute infarct or bleed. His labs were notable for hypernatremia to 152, Cr of 1.3, Gap of 28, CK to 360, Ph of 7.18, and UTox positive for cocaine. He was keppra loaded with 1g and bolused with 2L of NS. He was admitted to  for further management of status epilepticus. Patient comfortable overnight, no seizure activity on EEG. Patient back at baseline and requesting to be discharged due to obligations. Clinically stable. Discussed importance of compliance with AEDs an discussed  addition of Keppra 500 mg BID for now. Instructed to f/u as outpatient with Neurology

## 2018-08-06 NOTE — PROGRESS NOTE ADULT - PROBLEM SELECTOR PLAN 1
Patient experienced 2 GTC seizures without intervening return to baseline. Takes lamotrigine ER 200mg at home, but compliance is poor as per family. Has not followed up with neurologist in several years.  - Start Keppra 300BID  - c/w lamotrigine ER 200mg qD  - s/p 4mg ativan in ED, will give PRN seizure activity   - vEEG in AM

## 2018-08-06 NOTE — PROGRESS NOTE ADULT - PROBLEM SELECTOR PLAN 9
Patient's PCP affliated with [facility]  Patient's neurologist in [place], name unknown.

## 2018-08-06 NOTE — DISCHARGE NOTE ADULT - CARE PLAN
Principal Discharge DX:	Seizure  Goal:	Please take medication as prescribed  Assessment and plan of treatment:	You came to the hospital after you had a witnessed seizure. You had another seizure whilst you were in the Emergency department and we gave you some medication afterwards to prevent further seizures from happening. We admitted you for closer monitoring and placed you on video EEG to monitor for any more seizures. You were stable overnight without any repeat episodes. We added and extra medication to your anti-epileptic medication to protect you from having another seizure. Please remember that you will not be able to drive until 8/6/2019 (1 year from the date of your last seizure)  Secondary Diagnosis:	Cocaine abuse  Goal:	Please try and abstain from illicit drug use as this may increase your risk of having another seizure.  Assessment and plan of treatment:	We did a Urine toxicology and found that you had cocaine in your urine. Cocaine can cause seizure, and we encourage you to try and abstain from using it. In any case please take your anti-epiliptic medications as prescribed to decrease your chances of seizure  Secondary Diagnosis:	Transition of care performed with sharing of clinical summary  Assessment and plan of treatment:	Please follow up with the Neurologist and your primary care doctor within 2 weeks. I attached the details of the Neurologist that saw you in the  hospital. Please schedule an appointment to see her in the office at your earliest convenience.

## 2018-08-06 NOTE — H&P ADULT - NSHPSOCIALHISTORY_GEN_ALL_CORE
.  Tobacco use: Denies  EtOH use: 3-4 cocktails per week  Illicit drug use: Denies, however UTox positive for cocaine    Living situation: Staying with friend  Occupation: Tech consultant

## 2018-08-07 ENCOUNTER — INBOUND DOCUMENT (OUTPATIENT)
Age: 38
End: 2018-08-07

## 2018-08-07 VITALS — TEMPERATURE: 98 F

## 2018-08-07 PROBLEM — Z00.00 ENCOUNTER FOR PREVENTIVE HEALTH EXAMINATION: Status: ACTIVE | Noted: 2018-08-07

## 2018-08-07 LAB — LAMOTRIGINE SERPL-MCNC: 4.7 MCG/ML — SIGNIFICANT CHANGE UP (ref 2.5–15)

## 2018-08-08 LAB — LAMOTRIGINE SERPL-MCNC: 4.9 MCG/ML — SIGNIFICANT CHANGE UP (ref 2.5–15)

## 2018-08-10 DIAGNOSIS — N17.9 ACUTE KIDNEY FAILURE, UNSPECIFIED: ICD-10-CM

## 2018-08-10 DIAGNOSIS — Z91.14 PATIENT'S OTHER NONCOMPLIANCE WITH MEDICATION REGIMEN: ICD-10-CM

## 2018-08-10 DIAGNOSIS — G40.901 EPILEPSY, UNSPECIFIED, NOT INTRACTABLE, WITH STATUS EPILEPTICUS: ICD-10-CM

## 2018-08-10 DIAGNOSIS — D72.829 ELEVATED WHITE BLOOD CELL COUNT, UNSPECIFIED: ICD-10-CM

## 2018-08-10 DIAGNOSIS — F14.10 COCAINE ABUSE, UNCOMPLICATED: ICD-10-CM

## 2018-08-10 DIAGNOSIS — E87.2 ACIDOSIS: ICD-10-CM

## 2018-08-10 DIAGNOSIS — E87.0 HYPEROSMOLALITY AND HYPERNATREMIA: ICD-10-CM

## 2018-08-16 ENCOUNTER — INBOUND DOCUMENT (OUTPATIENT)
Age: 38
End: 2018-08-16

## 2018-12-26 PROCEDURE — 83935 ASSAY OF URINE OSMOLALITY: CPT

## 2018-12-26 PROCEDURE — 80053 COMPREHEN METABOLIC PANEL: CPT

## 2018-12-26 PROCEDURE — 84300 ASSAY OF URINE SODIUM: CPT

## 2018-12-26 PROCEDURE — 85027 COMPLETE CBC AUTOMATED: CPT

## 2018-12-26 PROCEDURE — 85025 COMPLETE CBC W/AUTO DIFF WBC: CPT

## 2018-12-26 PROCEDURE — 71045 X-RAY EXAM CHEST 1 VIEW: CPT

## 2018-12-26 PROCEDURE — 96375 TX/PRO/DX INJ NEW DRUG ADDON: CPT

## 2018-12-26 PROCEDURE — 82550 ASSAY OF CK (CPK): CPT

## 2018-12-26 PROCEDURE — 83735 ASSAY OF MAGNESIUM: CPT

## 2018-12-26 PROCEDURE — 70450 CT HEAD/BRAIN W/O DYE: CPT

## 2018-12-26 PROCEDURE — 83930 ASSAY OF BLOOD OSMOLALITY: CPT

## 2018-12-26 PROCEDURE — 93005 ELECTROCARDIOGRAM TRACING: CPT

## 2018-12-26 PROCEDURE — 83605 ASSAY OF LACTIC ACID: CPT

## 2018-12-26 PROCEDURE — 82330 ASSAY OF CALCIUM: CPT

## 2018-12-26 PROCEDURE — 99285 EMERGENCY DEPT VISIT HI MDM: CPT | Mod: 25

## 2018-12-26 PROCEDURE — 85730 THROMBOPLASTIN TIME PARTIAL: CPT

## 2018-12-26 PROCEDURE — 82570 ASSAY OF URINE CREATININE: CPT

## 2018-12-26 PROCEDURE — 85610 PROTHROMBIN TIME: CPT

## 2018-12-26 PROCEDURE — 96374 THER/PROPH/DIAG INJ IV PUSH: CPT

## 2018-12-26 PROCEDURE — 36415 COLL VENOUS BLD VENIPUNCTURE: CPT

## 2018-12-26 PROCEDURE — 84295 ASSAY OF SERUM SODIUM: CPT

## 2018-12-26 PROCEDURE — 82803 BLOOD GASES ANY COMBINATION: CPT

## 2018-12-26 PROCEDURE — 84132 ASSAY OF SERUM POTASSIUM: CPT

## 2018-12-26 PROCEDURE — 80175 DRUG SCREEN QUAN LAMOTRIGINE: CPT

## 2018-12-26 PROCEDURE — 84100 ASSAY OF PHOSPHORUS: CPT

## 2018-12-26 PROCEDURE — 80307 DRUG TEST PRSMV CHEM ANLYZR: CPT

## 2021-11-02 ENCOUNTER — TRANSCRIPTION ENCOUNTER (OUTPATIENT)
Age: 41
End: 2021-11-02

## 2023-02-08 NOTE — PROGRESS NOTE ADULT - PROBLEM SELECTOR PLAN 6
Patient UTox positive for cocaine, denies use.  - Will  regarding cocaine abuse cessation Vaccine status unknown